# Patient Record
Sex: MALE | Race: WHITE | NOT HISPANIC OR LATINO | Employment: STUDENT | URBAN - METROPOLITAN AREA
[De-identification: names, ages, dates, MRNs, and addresses within clinical notes are randomized per-mention and may not be internally consistent; named-entity substitution may affect disease eponyms.]

---
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
## 2024-05-25 ENCOUNTER — HOSPITAL ENCOUNTER (EMERGENCY)
Facility: HOSPITAL | Age: 19
Discharge: HOME | End: 2024-05-25
Attending: EMERGENCY MEDICINE
Payer: COMMERCIAL

## 2024-05-25 VITALS
TEMPERATURE: 98.4 F | HEART RATE: 64 BPM | DIASTOLIC BLOOD PRESSURE: 71 MMHG | WEIGHT: 173 LBS | OXYGEN SATURATION: 99 % | HEIGHT: 74 IN | SYSTOLIC BLOOD PRESSURE: 129 MMHG | RESPIRATION RATE: 16 BRPM | BODY MASS INDEX: 22.2 KG/M2

## 2024-05-25 DIAGNOSIS — R30.0 DYSURIA: Primary | ICD-10-CM

## 2024-05-25 LAB
BACTERIA UR QL AUTO: NORMAL /HPF
BILIRUB UR QL STRIP: NEGATIVE
CLARITY UR: CLEAR
COLOR UR: ABNORMAL
GLUCOSE UR STRIP-MCNC: NEGATIVE MG/DL
HGB UR QL STRIP.AUTO: NEGATIVE
KETONES UR STRIP-MCNC: NEGATIVE MG/DL
LEUKOCYTE ESTERASE UR QL STRIP: NEGATIVE
NITRITE UR QL STRIP: NEGATIVE
NON-SQ EPI CELLS URNS QL MICRO: NORMAL /HPF
PH UR STRIP.AUTO: 7 [PH]
PROT UR STRIP-MCNC: ABNORMAL MG/DL
RBC #/AREA URNS AUTO: NORMAL /HPF
SP GR UR STRIP.AUTO: 1.03 (ref 1–1.03)
UROBILINOGEN UR STRIP-ACNC: <2 MG/DL
WBC #/AREA URNS AUTO: NORMAL /HPF

## 2024-05-25 PROCEDURE — 99283 EMERGENCY DEPT VISIT LOW MDM: CPT

## 2024-05-25 PROCEDURE — 81001 URINALYSIS AUTO W/SCOPE: CPT | Performed by: EMERGENCY MEDICINE

## 2024-05-25 PROCEDURE — 87491 CHLMYD TRACH DNA AMP PROBE: CPT | Performed by: NURSE PRACTITIONER

## 2024-05-25 PROCEDURE — 99284 EMERGENCY DEPT VISIT MOD MDM: CPT | Performed by: NURSE PRACTITIONER

## 2024-05-25 PROCEDURE — 87591 N.GONORRHOEAE DNA AMP PROB: CPT | Performed by: NURSE PRACTITIONER

## 2024-05-26 NOTE — ED PROVIDER NOTES
History  Chief Complaint   Patient presents with    Possible UTI     Pt reports urinary frequency along with pain while urinating. Pt reports this started December of 2023, pt hasn't been seen for this issue.      19-year-old male patient presenting here with a chief complaint of urinary discomfort since December.  He reports he woke up on December 23 at 1 point and had some pain after voiding that continued for about a month and then since dissipated and then returned intermittently since that time.  He denies any fever chills or flank pain.  He is sexually active.  He denies any penile discharge or testicular pain.  He is circumcised.          Prior to Admission Medications   Prescriptions Last Dose Informant Patient Reported? Taking?   Ascorbic Acid (Vitamin C) 500 MG CAPS   Yes No   Sig: Take by mouth   [employer] D3 50 MCG (2000 UT) CAPS   Yes No   Sig: Take 1 capsule by mouth daily   Pediatric Multiple Vit-C-FA (Childrens Chewable Vitamins) chewable tablet   Yes No   Sig: Chew   albuterol (PROVENTIL HFA,VENTOLIN HFA) 90 mcg/act inhaler   Yes No   Sig: INHALE 2 PUFFS EVERY 4 HOURS AS NEEDED FOR WHEEZE OR FOR SHORTNESS OF BREATH/COUGH   cetirizine (ZyrTEC) 10 MG chewable tablet   Yes No   Sig: Chew   docusate sodium (COLACE) 100 mg capsule   No No   Sig: Take 2 capsules (200 mg total) by mouth 2 (two) times a day   ketotifen (ZADITOR) 0.025 % ophthalmic solution   Yes No   Sig: Apply to eye   mometasone (NASONEX) 50 mcg/act nasal spray   Yes No   Sig: into each nostril   senna (SENOKOT) 8.6 mg   No No   Sig: Take 2 tablets (17.2 mg total) by mouth daily at bedtime      Facility-Administered Medications: None       Past Medical History:   Diagnosis Date    Asthma        History reviewed. No pertinent surgical history.    Family History   Problem Relation Age of Onset    Celiac disease Maternal Grandmother     Short stature Cousin      I have reviewed and agree with the history as documented.    E-Cigarette/Vaping     E-Cigarette Use Never User      E-Cigarette/Vaping Substances     Social History     Tobacco Use    Smoking status: Never    Smokeless tobacco: Never   Vaping Use    Vaping status: Never Used   Substance Use Topics    Alcohol use: Never    Drug use: Never       Review of Systems   Constitutional:  Negative for chills and fever.   HENT:  Negative for ear pain and sore throat.    Eyes:  Negative for pain and visual disturbance.   Respiratory:  Negative for cough and shortness of breath.    Cardiovascular:  Negative for chest pain and palpitations.   Gastrointestinal:  Negative for abdominal pain and vomiting.   Genitourinary:  Positive for dysuria and frequency. Negative for hematuria.   Musculoskeletal:  Negative for arthralgias and back pain.   Skin:  Negative for color change and rash.   Neurological:  Negative for seizures and syncope.   All other systems reviewed and are negative.      Physical Exam  Physical Exam  Vitals and nursing note reviewed.   Constitutional:       General: He is not in acute distress.     Appearance: He is well-developed.   HENT:      Head: Normocephalic and atraumatic.   Eyes:      General:         Right eye: No discharge.         Left eye: No discharge.      Extraocular Movements: EOM normal.      Conjunctiva/sclera: Conjunctivae normal.   Cardiovascular:      Rate and Rhythm: Normal rate.   Pulmonary:      Effort: Pulmonary effort is normal. No respiratory distress.   Abdominal:      General: There is no distension.      Tenderness: There is no abdominal tenderness. There is no right CVA tenderness, left CVA tenderness or guarding.   Musculoskeletal:         General: No deformity or edema.      Cervical back: Normal range of motion and neck supple.   Skin:     General: Skin is warm and dry.   Neurological:      Mental Status: He is alert and oriented to person, place, and time.      Coordination: Coordination normal.   Psychiatric:         Mood and Affect: Mood and affect normal.          Vital Signs  ED Triage Vitals   Temperature Pulse Respirations Blood Pressure SpO2   05/25/24 2214 05/25/24 2214 05/25/24 2214 05/25/24 2214 05/25/24 2214   98.4 °F (36.9 °C) 64 16 129/71 99 %      Temp Source Heart Rate Source Patient Position - Orthostatic VS BP Location FiO2 (%)   05/25/24 2214 05/25/24 2214 05/25/24 2214 05/25/24 2214 --   Temporal Monitor Sitting Left arm       Pain Score       05/25/24 2215       No Pain           Vitals:    05/25/24 2214   BP: 129/71   Pulse: 64   Patient Position - Orthostatic VS: Sitting         Visual Acuity      ED Medications  Medications - No data to display    Diagnostic Studies  Results Reviewed       Procedure Component Value Units Date/Time    Urine Microscopic [[id number]]  (Normal) Collected: 05/25/24 2248    Lab Status: Final result Specimen: Urine, Clean Catch Updated: 05/25/24 2303     RBC, UA None Seen /hpf      WBC, UA None Seen /hpf      Epithelial Cells None Seen /hpf      Bacteria, UA None Seen /hpf     UA w Reflex to Microscopic w Reflex to Culture [[id number]]  (Abnormal) Collected: 05/25/24 2248    Lab Status: Final result Specimen: Urine, Clean Catch Updated: 05/25/24 2302     Color, UA Light Yellow     Clarity, UA Clear     Specific Gravity, UA 1.027     pH, UA 7.0     Leukocytes, UA Negative     Nitrite, UA Negative     Protein, UA Trace mg/dl      Glucose, UA Negative mg/dl      Ketones, UA Negative mg/dl      Urobilinogen, UA <2.0 mg/dl      Bilirubin, UA Negative     Occult Blood, UA Negative    Chlamydia/GC amplified DNA by PCR [[id number]] Collected: 05/25/24 2248    Lab Status: In process Specimen: Urine, Other Updated: 05/25/24 2259                   No orders to display              Procedures  Procedures         ED Course                               SBIRT 20yo+      Flowsheet Row Most Recent Value   Initial Alcohol Screen: US AUDIT-C     1. How often do you have a drink containing alcohol? 0 Filed at: 05/25/2024 2216   2. How many  drinks containing alcohol do you have on a typical day you are drinking?  0 Filed at: 05/25/2024 2216   3a. Male UNDER 65: How often do you have five or more drinks on one occasion? 0 Filed at: 05/25/2024 2216   3b. FEMALE Any Age, or MALE 65+: How often do you have 4 or more drinks on one occassion? 0 Filed at: 05/25/2024 2216   Audit-C Score 0 Filed at: 05/25/2024 2216   MIGUEL: How many times in the past year have you...    Used an illegal drug or used a prescription medication for non-medical reasons? Never Filed at: 05/25/2024 2216                      Medical Decision Making  Dysuria for greater than 6 months.  Will continue to follow GC culture.  Likely will need outpatient follow-up with primary care to start if symptoms are continuing in the absence of any signs of infection.  Perhaps experiencing bladder spasms.    Amount and/or Complexity of Data Reviewed  Labs: ordered.             Disposition  Final diagnoses:   Dysuria     Time reflects when diagnosis was documented in both MDM as applicable and the Disposition within this note       Time User Action Codes Description Comment    5/25/2024 10:42 PM Kevon Francisco Add [R30.0] Dysuria           ED Disposition       ED Disposition   Discharge    Condition   Stable    Date/Time   Sat May 25, 2024 2242    Comment   Regino Traylor discharge to home/self care.                   Follow-up Information       Follow up With Specialties Details Why Contact Info Additional Information    [facility] Department Emergency Medicine Schedule an appointment as soon as possible for a visit  For Continued Evaluation [address] [facility] [id number] [phone] [facility] Emergency Department, [address] [place], [place], [address]    [facility] Family Medicine   [address] [place] [id number]  [phone]       [facility] [place] [facility] Schedule an  appointment as soon as possible for a visit  For Continued Evaluation [address]  [address]  [facility] [address] [phone] [facility] [place], [address], [place], [place], [address] [phone]            Discharge Medication List as of 5/25/2024 10:43 PM        CONTINUE these medications which have NOT CHANGED    Details   albuterol (PROVENTIL HFA,VENTOLIN HFA) 90 mcg/act inhaler INHALE 2 PUFFS EVERY 4 HOURS AS NEEDED FOR WHEEZE OR FOR SHORTNESS OF BREATH/COUGH, Historical Med      Ascorbic Acid (Vitamin C) 500 MG CAPS Take by mouth, Historical Med      cetirizine (ZyrTEC) 10 MG chewable tablet Chew, Historical Med      CVS D3 50 MCG (2000 UT) CAPS Take 1 capsule by mouth daily, Starting Sun 9/20/2020, Historical Med      docusate sodium (COLACE) 100 mg capsule Take 2 capsules (200 mg total) by mouth 2 (two) times a day, Starting Thu 11/12/2020, Normal      ketotifen (ZADITOR) 0.025 % ophthalmic solution Apply to eye, Historical Med      mometasone (NASONEX) 50 mcg/act nasal spray into each nostril, Historical Med      Pediatric Multiple Vit-C-FA (Childrens Chewable Vitamins) chewable tablet Chew, Historical Med      senna (SENOKOT) 8.6 mg Take 2 tablets (17.2 mg total) by mouth daily at bedtime, Starting Thu 11/12/2020, Normal             No discharge procedures on file.    PDMP Review       None            ED Provider  Electronically Signed by             GERMAN Saavedra  05/26/24 2175

## 2024-05-27 LAB
C TRACH DNA SPEC QL NAA+PROBE: NEGATIVE
N GONORRHOEA DNA SPEC QL NAA+PROBE: NEGATIVE